# Patient Record
Sex: MALE | Race: WHITE | URBAN - METROPOLITAN AREA
[De-identification: names, ages, dates, MRNs, and addresses within clinical notes are randomized per-mention and may not be internally consistent; named-entity substitution may affect disease eponyms.]

---

## 2019-07-22 ENCOUNTER — HOSPITAL ENCOUNTER (OUTPATIENT)
Dept: MEDSURG UNIT | Facility: HOSPITAL | Age: 54
End: 2019-07-23
Attending: INTERNAL MEDICINE | Admitting: INTERNAL MEDICINE

## 2019-07-22 LAB
ABS NEUT (OLG): 7.6 X10(3)/MCL (ref 1.5–6.9)
ALBUMIN SERPL-MCNC: 4.5 GM/DL (ref 3.4–5)
ALBUMIN/GLOB SERPL: 1.5 RATIO
ALP SERPL-CCNC: 106 UNIT/L (ref 30–113)
ALT SERPL-CCNC: 34 UNIT/L (ref 10–45)
APTT PPP: 24.3 SECOND(S) (ref 25–35)
AST SERPL-CCNC: 23 UNIT/L (ref 15–37)
BASOPHILS # BLD AUTO: 0 X10(3)/MCL (ref 0–0.1)
BASOPHILS NFR BLD AUTO: 0 % (ref 0–1)
BILIRUB SERPL-MCNC: 0.4 MG/DL (ref 0.1–0.9)
BILIRUBIN DIRECT+TOT PNL SERPL-MCNC: 0.1 MG/DL (ref 0–0.3)
BILIRUBIN DIRECT+TOT PNL SERPL-MCNC: 0.3 MG/DL
BUN SERPL-MCNC: 20 MG/DL (ref 10–20)
CALCIUM SERPL-MCNC: 9.2 MG/DL (ref 8–10.5)
CHLORIDE SERPL-SCNC: 102 MMOL/L (ref 100–108)
CK MB SERPL-MCNC: 1.2 NG/ML (ref 0–3.6)
CK MB SERPL-MCNC: 1.4 NG/ML (ref 0–3.6)
CK SERPL-CCNC: 133 UNIT/L (ref 39–225)
CK SERPL-CCNC: 99 UNIT/L (ref 39–225)
CO2 SERPL-SCNC: 27 MMOL/L (ref 21–35)
CREAT SERPL-MCNC: 1.02 MG/DL (ref 0.7–1.3)
D DIMER PPP IA.FEU-MCNC: 0.17 MG/L
EOSINOPHIL # BLD AUTO: 0.1 X10(3)/MCL (ref 0–0.6)
EOSINOPHIL NFR BLD AUTO: 1 % (ref 0–5)
ERYTHROCYTE [DISTWIDTH] IN BLOOD BY AUTOMATED COUNT: 12.4 % (ref 11.5–17)
GLOBULIN SER-MCNC: 3 GM/DL
GLUCOSE SERPL-MCNC: 108 MG/DL (ref 75–116)
HCT VFR BLD AUTO: 45.7 % (ref 42–52)
HGB BLD-MCNC: 16.2 GM/DL (ref 14–18)
IMM GRANULOCYTES # BLD AUTO: 0.04 10*3/UL (ref 0–0.02)
IMM GRANULOCYTES NFR BLD AUTO: 0.4 % (ref 0–0.43)
INR PPP: 1 (ref 0–1.2)
LYMPHOCYTES # BLD AUTO: 1.5 X10(3)/MCL (ref 0.5–4.1)
LYMPHOCYTES NFR BLD AUTO: 16 % (ref 15–40)
MCH RBC QN AUTO: 31 PG (ref 27–34)
MCHC RBC AUTO-ENTMCNC: 35 GM/DL (ref 31–36)
MCV RBC AUTO: 89 FL (ref 80–99)
MONOCYTES # BLD AUTO: 0.6 X10(3)/MCL (ref 0–1.1)
MONOCYTES NFR BLD AUTO: 6 % (ref 4–12)
NEUTROPHILS # BLD AUTO: 7.6 X10(3)/MCL (ref 1.5–6.9)
NEUTROPHILS NFR BLD AUTO: 77 % (ref 43–75)
PLATELET # BLD AUTO: 203 X10(3)/MCL (ref 140–400)
PMV BLD AUTO: 8.7 FL (ref 6.8–10)
POTASSIUM SERPL-SCNC: 4.1 MMOL/L (ref 3.6–5.2)
PROT SERPL-MCNC: 7.5 GM/DL (ref 6.4–8.2)
PROTHROMBIN TIME: 10 SECOND(S) (ref 9–12)
RBC # BLD AUTO: 5.16 X10(6)/MCL (ref 4.7–6.1)
SODIUM SERPL-SCNC: 139 MMOL/L (ref 135–145)
TROPONIN I SERPL-MCNC: <0.017 NG/ML (ref 0–0.06)
TROPONIN I SERPL-MCNC: <0.017 NG/ML (ref 0–0.06)
WBC # SPEC AUTO: 9.8 X10(3)/MCL (ref 4.5–11.5)

## 2019-07-23 LAB
ABS NEUT (OLG): 4.4 X10(3)/MCL (ref 1.5–6.9)
ALBUMIN SERPL-MCNC: 3.7 GM/DL (ref 3.4–5)
ALBUMIN/GLOB SERPL: 1.3 RATIO
ALP SERPL-CCNC: 94 UNIT/L (ref 30–113)
ALT SERPL-CCNC: 30 UNIT/L (ref 10–45)
AST SERPL-CCNC: 17 UNIT/L (ref 15–37)
BASOPHILS # BLD AUTO: 0 X10(3)/MCL (ref 0–0.1)
BASOPHILS NFR BLD AUTO: 0 % (ref 0–1)
BILIRUB SERPL-MCNC: 0.5 MG/DL (ref 0.1–0.9)
BILIRUBIN DIRECT+TOT PNL SERPL-MCNC: 0.1 MG/DL (ref 0–0.3)
BILIRUBIN DIRECT+TOT PNL SERPL-MCNC: 0.4 MG/DL
BUN SERPL-MCNC: 20 MG/DL (ref 10–20)
CALCIUM SERPL-MCNC: 8.7 MG/DL (ref 8–10.5)
CHLORIDE SERPL-SCNC: 104 MMOL/L (ref 100–108)
CK MB SERPL-MCNC: 1 NG/ML (ref 0–3.6)
CK SERPL-CCNC: 81 UNIT/L (ref 39–225)
CO2 SERPL-SCNC: 29 MMOL/L (ref 21–35)
CREAT SERPL-MCNC: 1.02 MG/DL (ref 0.7–1.3)
EOSINOPHIL # BLD AUTO: 0.1 X10(3)/MCL (ref 0–0.6)
EOSINOPHIL NFR BLD AUTO: 2 % (ref 0–5)
ERYTHROCYTE [DISTWIDTH] IN BLOOD BY AUTOMATED COUNT: 12.6 % (ref 11.5–17)
GLOBULIN SER-MCNC: 2.9 GM/DL
GLUCOSE SERPL-MCNC: 103 MG/DL (ref 75–116)
HCT VFR BLD AUTO: 43 % (ref 42–52)
HGB BLD-MCNC: 15 GM/DL (ref 14–18)
IMM GRANULOCYTES # BLD AUTO: 0.03 10*3/UL (ref 0–0.02)
IMM GRANULOCYTES NFR BLD AUTO: 0.4 % (ref 0–0.43)
LYMPHOCYTES # BLD AUTO: 1.5 X10(3)/MCL (ref 0.5–4.1)
LYMPHOCYTES NFR BLD AUTO: 23 % (ref 15–40)
MCH RBC QN AUTO: 31 PG (ref 27–34)
MCHC RBC AUTO-ENTMCNC: 35 GM/DL (ref 31–36)
MCV RBC AUTO: 90 FL (ref 80–99)
MONOCYTES # BLD AUTO: 0.6 X10(3)/MCL (ref 0–1.1)
MONOCYTES NFR BLD AUTO: 8 % (ref 4–12)
NEUTROPHILS # BLD AUTO: 4.4 X10(3)/MCL (ref 1.5–6.9)
NEUTROPHILS NFR BLD AUTO: 66 % (ref 43–75)
PLATELET # BLD AUTO: 176 X10(3)/MCL (ref 140–400)
PMV BLD AUTO: 8.6 FL (ref 6.8–10)
POTASSIUM SERPL-SCNC: 4.4 MMOL/L (ref 3.6–5.2)
PROT SERPL-MCNC: 6.6 GM/DL (ref 6.4–8.2)
RBC # BLD AUTO: 4.79 X10(6)/MCL (ref 4.7–6.1)
SODIUM SERPL-SCNC: 140 MMOL/L (ref 135–145)
TROPONIN I SERPL-MCNC: <0.017 NG/ML (ref 0–0.06)
WBC # SPEC AUTO: 6.7 X10(3)/MCL (ref 4.5–11.5)

## 2019-08-15 ENCOUNTER — HISTORICAL (OUTPATIENT)
Dept: ANESTHESIOLOGY | Facility: HOSPITAL | Age: 54
End: 2019-08-15

## 2022-04-30 NOTE — OP NOTE
PROCEDURE PERFORMED:  Postoperative esophagogastroduodenoscopy.    PREOPERATIVE DIAGNOSIS:  Abdominal pain and gastritis.    POSTOPERATIVE DIAGNOSIS:  Moderate gastritis, chronic.    HISTORY OF PRESENT ILLNESS:  This is a 54-year-old white male who was having some chest pain and was initially worked up by Dr. Joiner and ruled out for cardiac ischemia because of the severity of the pain and the character of it.  He has been taking NSAIDs nightly for the last several years.  He does this typically for pain and if he has a couple alcoholic beverages.       Patient was having chest pain that was actually radiating up into his left arm through his back, and the story that he had was a little concerning.  His cardiac workup was negative.  We are pursuing EGD to confirm that there are no competing issues for his abdominal pain.  Of note, after he was on PPI and NSAID cessation, his abdominal pain improved.     He was consented for the procedure prior to.  The risks and benefits of the procedure were explained to him in detail.  He is willing to undergo those risks.    DESCRIPTION OF PROCEDURE:  The patient brought down to the endoscopy suite, laid in a semi-left lateral decubitus position.  A bite block was placed.  Viscous lidocaine applied to the posterior oropharynx.     The CRNA, Mike Melendez, was present for anesthesia.  See his documentation for medication administration consents.     EGD:  Gastroscope was then inserted down the posterior oropharynx, whereupon there were no abnormalities.  I then eased down into the esophagus and into the stomach without any abnormalities.     120 mL of Mylicon were then applied into the stomach and then aspirated out after I was able to clear up the gastric juices.     The scope was then placed into the duodenum through the pylorus.  He had some mild duodenitis.  The jejunum was free of any masses, lesions, polyps.     The scope was then pulled back in the gastric antrum.   Greater and lesser curvatures showed moderate gastritis that was chronic.  He had some cobblestoning in the angularis and fundic and cardiac regions.  In retroflexion, there was no hiatal hernia.  No masses.  No appreciable areas to explain any persistent pain.  The scope was taken out of retroflexion.  The GE junction was examined.  It was within normal limits.  No tertiary contraction of the esophagus no signs of stricture.  The scope was removed.  Patient tolerated the procedure well.     In summary, this is a 54-year-old white male with moderate gastritis and duodenitis.    RECOMMENDATIONS:  NSAID cessation and PPI for minimum of 3 months, and then will gradually allow him to participate back on a more moderate diet in terms of his dietary intake, as well as alcohol with libations.     I appreciate Dr. Joiner's assistance and initial workup.  I will continue to follow the patient up in my office.        AIDEN   DD: 08/15/2019 1039   DT: 08/15/2019 1102  Job # [id number]    cc: ALFA Mcneil III, M.D.

## 2022-04-30 NOTE — ED PROVIDER NOTES
Patient:   Han Brumfield             MRN: [id number]            FIN: [id number]               Age:   54 years     Sex:  Male     :  1965   Associated Diagnoses:   Chest pain   Author:   Gurmeet Reeves MD      Basic Information   History source: Patient.   Arrival mode: Private vehicle.   History limitation: None.   Additional information: Chief Complaint from Nursing Triage Note : Chief Complaint   2019 16:37 CDT      Chief Complaint           c/o mid chest pain starting 45 min PTA  .   Provider/Visit info:   Time Seen:  Gurmeet Reeves MD / 2019 16:43  .   History of Present Illness   The patient presents with chest pain.  The onset was just prior to arrival.  The course/duration of symptoms is improving.  Location: Substernal epigastric. Radiating pain: none. The character of symptoms is achy and sharp.  The degree at onset was moderate.  The degree at maximum was moderate.  The degree at present is minimal.  The exacerbating factor is none.  The relieving factor is none.  Risk factors consist of none and GERD.  Prior episodes: none.  Therapy today None.  Associated symptoms: none.  According to patient he was with his father at the doctor's office where he started having tightness in the chest and some sharp pains, when he left the office the pain got worse to the point that he was going to pass out.  He decided to come to the emergency room..        Review of Systems   Constitutional symptoms:  No fever, no chills, no sweats.    Skin symptoms:  No jaundice, no rash.    Eye symptoms:  Negative except as documented in HPI.   ENMT symptoms:  No sore throat,    Respiratory symptoms:  No shortness of breath, no cough, no wheezing.    Cardiovascular symptoms:  Chest pain, achy, sharp, no palpitations, no tachycardia.    Gastrointestinal symptoms:  Abdominal pain, epigastric, sharp, achy, no nausea, no vomiting, no diarrhea, no constipation.    Genitourinary symptoms:  No dysuria,     Musculoskeletal symptoms:  No back pain,    Neurologic symptoms:  No headache, no dizziness.    Psychiatric symptoms:  No anxiety, no depression, no substance abuse.    Allergy/immunologic symptoms:  No seasonal allergies,              Additional review of systems information: All other systems reviewed and otherwise negative.      Health Status   Allergies:    Allergic Reactions (Selected)  Severity Not Documented  Codeine- Rash..   Medications:  (Selected)   Documented Medications  Documented  NexIUM 40 mg oral delayed release capsule: 40 mg = 1 cap(s), Oral, Daily, # 30 cap(s), 0 Refill(s), per nurse's notes.      Past Medical/ Family/ Social History   Medical history:    Active  Gastric reflux (SNOMED CT [id number]), Reviewed as documented in chart.   Surgical history:    No active procedure history items have been selected or recorded., Reviewed as documented in chart.   Family history:    Rheumatoid arthritis  Father  Hypertension.  Mother  , Reviewed as documented in chart.   Social history:    Social & Psychosocial Habits    Alcohol  07/22/2019  Type: Beer    Frequency: Daily    Tobacco  07/22/2019  Use: Never (less than 100 in l    Patient Wants Consult For Cessation Counseling N/A    Abuse/Neglect  07/22/2019  SHX Any signs of abuse or neglect No  , Reviewed as documented in chart.   Problem list:    Active Problems (2)  Gastric reflux   Obesity   , per nurse's notes.      Physical Examination               Vital Signs   Vital Signs   7/22/2019 16:50 CDT      Heart Rate Monitored      83 bpm    7/22/2019 16:37 CDT      Temperature Oral          36.5 DegC                             Temperature Oral (calculated)             97.70 DegF                             Peripheral Pulse Rate     88 bpm                             Respiratory Rate          20 br/min                             SpO2                      98 %                             Oxygen Therapy            Room air                              Systolic Blood Pressure   175 mmHg  HI                             Diastolic Blood Pressure  106 mmHg  HI  .   Measurements   2019 16:37 CDT      Weight Dosing             91.8 kg                             Weight Measured           91.8 kg                             Weight Measured and Calculated in Lbs     202.38 lb                             Height/Length Dosing      165 cm                             Height/Length Measured    165 cm                             Body Mass Index Measured  33.72 kg/m2  .   Basic Oxygen Information   2019 16:37 CDT      SpO2                      98 %                             Oxygen Therapy            Room air  .   General:  Alert, no acute distress.    [place] coma scale:  Total score: Total score: 15.   Neurological:  Alert and oriented to person, place, time, and situation, normal motor observed, normal speech observed.    Skin:  Normal for ethnicity.   Head:  Normocephalic.   Neck:  Supple.   Eye:  Extraocular movements are intact, No icterus.    Ears, nose, mouth and throat:  Oral mucosa moist, no pharyngeal erythema or exudate.    Cardiovascular:  Regular rate and rhythm, No murmur, Normal peripheral perfusion, No edema.    Respiratory:  Lungs are clear to auscultation, respirations are non-labored, breath sounds are equal.    Back:  Nontender, Normal range of motion.    Musculoskeletal:  Normal ROM.   Chest wall   Gastrointestinal:  Soft, Nontender, Non distended, Normal bowel sounds.    Lymphatics   Psychiatric:  Cooperative, appropriate mood & affect.       Medical Decision Making   Documents reviewed:  Emergency department nurses' notes.   Orders  Launch Order Profile (Selected)   Inpatient Orders  Ordered  Cardiac Monitorin19 16:43:00 CDT, Constant Order, Place on telemetry.  IVF Normal Saline NS Infusion 1,000 mL: 1,000 mL, 1,000 mL, IV, 155 mL/hr, start date 19 16:43:00 CDT, 1.99, m2  Ordered (Exam Completed)  XR Chest 1 View: Stat, 19  16:43:00 CDT, Chest Pain, None, Stretcher, Rad Type, Not Scheduled, 07/22/19 16:43:00 CDT  Ordered (In-Lab)  BNP-Pro: STAT collect, 07/22/19 16:54:59 CDT, BLOOD, Collected, Stop date 07/22/19 16:43:00 CDT, Lab Collect  CK MB: STAT collect, 07/22/19 16:54:59 CDT, BLOOD, Collected, Stop date 07/22/19 16:54:00 CDT, Lab Collect  CMP: STAT collect, 07/22/19 16:54:59 CDT, BLOOD, Collected, Stop date 07/22/19 16:54:00 CDT, Lab Collect  CPK: STAT collect, 07/22/19 16:54:59 CDT, BLOOD, Collected, Stop date 07/22/19 16:54:00 CDT, Lab Collect  PT (Protime): STAT collect, 07/22/19 16:54:59 CDT, BLOOD, Collected, Stop date 07/22/19 16:43:00 CDT, Lab Collect  PTT: STAT collect, 07/22/19 16:54:59 CDT, BLOOD, Collected, Stop date 07/22/19 16:54:00 CDT, Lab Collect  Troponin-I: STAT collect, 07/22/19 16:54:59 CDT, BLOOD, Collected, Stop date 07/22/19 16:43:00 CDT, Lab Collect  Completed  Automated Diff: STAT collect, 07/22/19 16:54:00 CDT, Blood, Collected, Stop date 07/22/19 16:54:00 CDT, Lab Collect, 07/22/19 16:43:00 CDT  Blood Pressure: 07/22/19 16:43:00 CDT, Stop date 07/22/19 16:43:00 CDT, Monitor blood pressure.  CBC w/ Auto Diff: STAT collect, 07/22/19 16:54:59 CDT, BLOOD, Collected, Stop date 07/22/19 16:54:00 CDT, Lab Collect  EKG Adult 12 Lead: 07/22/19 16:43:00 CDT, Stat, Chest Pain, 07/22/19 16:43:00 CDT, Show to provider upon completion., -1, -1, 07/22/19 16:43:00 CDT  Pulse Oximetry: 07/22/19 16:43:00 CDT, Stop date 07/22/19 16:43:00 CDT, Place on pulse oximetry.  Monitor oxygen saturation.  Saline Lock Insert: 07/22/19 16:43:00 CDT, Stop date 07/22/19 16:43:00 CDT  aspirin 81 mg oral tablet, CHEWABLE: 324 mg, form: Tab-Chew, Oral, Once-NOW, first dose 07/22/19 16:43:00 CDT, stop date 07/22/19 16:43:00 CDT, STAT, 4 chew tab = 5 grain ASA.   Electrocardiogram:  Time 7/22/2019 16:44:00, rate 87, normal sinus rhythm, No ST-T changes, no ectopy, normal TN & QRS intervals.    Results review:  Lab results : Lab View    7/22/2019 16:54 CDT      Sodium Lvl                139 mmol/L                             Potassium Lvl             4.1 mmol/L                             Chloride                  102 mmol/L                             CO2                       27 mmol/L                             Calcium Lvl               9.2 mg/dL                             Glucose Lvl               108 mg/dL                             BUN                       20 mg/dL                             Creatinine                1.02 mg/dL                             eGFR-AA                   >60 mL/min/1.73 m2  NA                             eGFR-TRANG                  >60 mL/min/1.73 m2  NA                             Bili Total                0.4 mg/dL                             Bili Direct               0.10 mg/dL                             Bili Indirect             0.30 mg/dL  NA                             AST                       23 unit/L                             ALT                       34 unit/L                             Alk Phos                  106 unit/L                             Total Protein             7.5 gm/dL                             Albumin Lvl               4.5 gm/dL                             Globulin                  3.00 gm/dL  NA                             A/G Ratio                 1.5 ratio  NA                             NT pro BNP.               9 pg/mL                             Total CK                  133 unit/L                             CK MB                     1.4 ng/mL                             Troponin-I                <0.017 ng/mL                             PT                        10.0 second(s)                             INR                       1.0                             PTT                       24.3 second(s)  LOW                             WBC                       9.8 x10(3)/mcL                             RBC                       5.16 x10(6)/mcL                              Hgb                       16.2 gm/dL                             Hct                       45.7 %                             Platelet                  203 x10(3)/mcL                             MCV                       89 fL                             MCH                       31 pg                             MCHC                      35 gm/dL                             RDW                       12.4 %                             MPV                       8.7 fL                             Abs Neut                  7.6 x10(3)/mcL  HI                             Neutro Auto               77 %  HI                             Lymph Auto                16 %                             Mono Auto                 6 %                             Eos Auto                  1 %                             Abs Eos                   0.1 x10(3)/mcL                             Basophil Auto             0 %                             Abs Neutro                7.6 x10(3)/mcL  HI                             Abs Lymph                 1.5 x10(3)/mcL                             Abs Mono                  0.6 x10(3)/mcL                             Abs Baso                  0.0 x10(3)/mcL                             IG%                       0.400 %                             IG#                       0.0400  HI  ,    Labs (Last four charted values)  WBC                  9.8 (JUL 22)   Hgb                  16.2 (JUL 22)   Hct                  45.7 (JUL 22)   Plt                  203 (JUL 22) .    Radiology results:  07/22/2019 17:44; Leandro DHALIWAL, [facility]; ;     X-ray chest one view: No focal consolidation    .      Reexamination/ Reevaluation   Time: 7/22/2019 17:43:00 .   Vital signs   results included from flowsheet : Vital Signs   7/22/2019 17:47 CDT      Peripheral Pulse Rate     85 bpm                             Respiratory Rate          16 br/min                             SpO2                      98 %                              Oxygen Therapy            Room air                             Systolic Blood Pressure   135 mmHg                             Diastolic Blood Pressure  85 mmHg                             Mean Arterial Pressure, Cuff              102 mmHg     Notes: Patient's cardiac workup is negative at this time, he is 53 years old and I am going to admit him in the hospital and repeat his cardiac enzymes and follow..      Impression and Plan   Diagnosis   Chest pain (OPZ91-CP R07.9)      Calls-Consults   -  7/22/2019 17:44:00 .   Plan   Disposition: Admit time  7/22/2019 17:45:00, Place in Observation Telemetry Unit.    Counseled: Patient, Family.    Orders: Launch Orders   Admit/Transfer/Discharge:  Admit to Outpatient Observation (Order): 7/22/2019 17:45 CDT, Telemetry with Monitor Tarun OSMAN MD, Sasha Meza, Chest pain.

## 2022-05-04 NOTE — HISTORICAL [facility] CERNER
This is a historical note converted from Antoni. Formatting and pictures may have been removed.  Please reference Antoni for original formatting and attached multimedia. Chief Complaint  c/o mid chest pain starting 45 min PTA  Reason for Consultation  chest pain  History of Present Illness  54 M with PMHx of HTN (untreated), GERD, and Obesity, Never smoker who presents with CP.? Patient describes experiencing substernal pressure-like CP when at Dr. Ram office initially with radiation to L shoulder with diaphoresis.? Denies assoc. symptoms of SOB, lightheadedness or dizziness.  ?  No prior hx of cardiac conditions, never smoked, of drug/etoh abuse.  Good baseline functional status with no lifestyle limitations  ?  Since his hospitalizations he has had 3 cardiac enzymes (all negative) and two EKGS (w/o any acute ischemic changes).  ?  Vitals and Labs in EMR reviewed.?  ?  Pt at time of this note, had near complete resolution of CP  Review of Systems  Negative except as noted in HPI  Physical Exam  Vitals & Measurements  T:?36.8? ?C (Oral)? TMIN:?36.1? ?C (Oral)? TMAX:?36.9? ?C (Oral)? HR:?75(Monitored)? RR:?19? BP:?147/86? SpO2:?97%? WT:?91.8?kg?  Gen: Laying in bed, no acute distress  Neck: no JVD, no carotid bruit  Resp: Lungs CTA b/l, no w/r/r  Heart: S1,S2, RRR, no m/r/g  GI: abd soft, Non-tender to palp  Ext: warm, 2+ b/l pulses, no edema, no skin changes  Assessment/Plan  Chest pain?[id number]  Chest pain?R07.9  Typical and atypical features  Cardiac Enz neg x 3  EKG w/o acute ischemic changes  Vitals stable  CP nearly resolved  ?  Plan:  Ok for discharge from cardiac standpoint  Pt needs an output ischemic w/u and cardiac risk stratification: TTE and Stress test  Pt can f/u with me at my office for above w/u  d/c home w/ 81mg ASA QD and 0.4 mg SL NTG PRN for CP  Pt advised to avoid any exertional activity including sex until cardiac w/u is complete.  ?  ?  ?   Problem List/Past  Medical History  Ongoing  Gastric reflux  Obesity  Historical  No qualifying data  Medications  Inpatient  Lovenox, 40 mg= 0.4 mL, Subcutaneous, Daily  nitroglycerin 0.4 mg sublingual TAB, 0.4 mg= 1 tab(s), SL, q5min, PRN  Protonix IV 40 mg intravenous injection +  100 mL  Zofran, 4 mg= 2 mL, IV Push, q4hr, PRN  Home  fluticasone 50 mcg/inh nasal spray, 1 spray(s), Nasal, Daily  NexIUM 40 mg oral delayed release capsule, 40 mg= 1 cap(s), Oral, Daily  Allergies  codeine?(Rash)  Social History  Abuse/Neglect  No, 07/22/2019  Alcohol  Beer, Daily, 07/22/2019  Tobacco  Never (less than 100 in lifetime), N/A, 07/22/2019  Family History  Hypertension.: Mother.  Rheumatoid arthritis: Father.

## 2022-05-04 NOTE — HISTORICAL [facility] CERNER
This is a historical note converted from Antoni. Formatting and pictures may have been removed.  Please reference Antoni for original formatting and attached multimedia. Chief Complaint  c/o mid chest pain starting 45 min PTA  History of Present Illness  This is a 54-year-old white male patient who?began having chest pain this afternoon.? He was actually in my office attending to his parents appointment with me at which point time while he left?started having some chest pain (he made no mention of that when i saw him) . ?He said it was central?chest pain that radiated across his?lower?precordial area and that radiated to his back and ?left arm?as a dull ache. ?He said by the time he Back home to [place], where he resides, he had to come back into the emergency room in [place]?at [facility]after?calling my office and?receiving?instructions.  ?  Patient was evaluated by Dr. Reeves. ?Initial cardiac workup was negative and were admitting him for 24-hour observation.  ?  Upon further questioning,?the patient is been using NSAIDs approximate 3-4 per day, usually at bedtime?for a number of years.? He usually takes these to avoid headaches?from drinking 2-3 beers?at night.? Of note,?he had some high blood pressure?approximately a year ago but lost about 15 pounds?and we elected to get him off the blood pressure medications. ?He presented to the emergency room today?in?hypertensive?state.  Review of Systems  See above, denies shortness of breath?denies leg swelling?denies postprandial nausea vomiting, denies dark stool  Physical Exam  Vitals & Measurements  T:?36.9? ?C (Oral)? TMIN:?36.5? ?C (Oral)? TMAX:?36.9? ?C (Oral)? HR:?87(Peripheral)? RR:?18? BP:?145/83? SpO2:?96%? WT:?91.8?kg?  He is alert and oriented ?3, his wife and 2 sons are present.? He is in no apparent distress.? Cranial nerve II-12 appear to be intact. ?No JVD.? Cardiac exam is regular rate and rhythm no rubs Murmurs?clear to auscultation  bilaterally. ?He has no chest wall tenderness. ?Full range of motion the upper shoulder elbow on the left side.  Assessment/Plan  Chest pain?[id number]  Differential diagnoses at this point, from most concerning standpoint will rule out cardiac enzymes??3. ?The 1st 2 are negative. ?Shania also get a d-dimer.? This is pending.? From a clinical standpoint I think which is more plausible is that he likely has some NSAID induced?GI issue. ?Close monitoring for?peptic ulcer disease. ?Consider EGD if the symptoms occur. ?Ill place him on a PPI?twice a day IV for now and then discharged him on PPIs. ?His?H&Hs are normal. ?However,?if he continues to show any?continued symptoms will perform urgent EGD as an outpatient?versus inpatient.  ?  Alternatively, Im concerned about his symptomatology at the time of presentation. ?He had?essentially, until proven otherwise, cardiac pain referring up to his left arm.? More plausibly the GI pain?could be sent radiating to his back which he was having.? The most plausible scenario is chronic?NSAID use?with?elevated blood pressure with cardiac strain. ?On that basis, Ill consult cardiology for outpatient workup, assuming all of his?cardiac?enzymes are negative.  ?  Therapeutically?PPI intravenously for GI, and nitroglycerin when necessary for any chest pain.  ?  Chest pain?R07.9   Problem List/Past Medical History  Ongoing  Gastric reflux  Obesity  Historical  No qualifying data  Medications  Inpatient  IVF Normal Saline NS Infusion 1,000 mL, 1000 mL, IV  Lovenox, 40 mg= 0.4 mL, Subcutaneous, Daily  Zofran, 4 mg= 2 mL, IV Push, q4hr, PRN  Home  fluticasone 50 mcg/inh nasal spray, 1 spray(s), Nasal, Daily  NexIUM 40 mg oral delayed release capsule, 40 mg= 1 cap(s), Oral, Daily  Allergies  codeine?(Rash)  Social History  Abuse/Neglect  No, 07/22/2019  Alcohol  Beer, Daily, 07/22/2019  Tobacco  Never (less than 100 in lifetime), N/A, 07/22/2019  Family  History  Hypertension.: Mother.  Rheumatoid arthritis: Father.  Lab Results  Test Name Test Result Date/Time Comments   Sodium Lvl 139 mmol/L 07/22/2019 16:54 CDT    Potassium Lvl 4.1 mmol/L 07/22/2019 16:54 CDT    Chloride 102 mmol/L 07/22/2019 16:54 CDT    CO2 27 mmol/L 07/22/2019 16:54 CDT    Calcium Lvl 9.2 mg/dL 07/22/2019 16:54 CDT    Glucose Lvl 108 mg/dL 07/22/2019 16:54 CDT    BUN 20 mg/dL 07/22/2019 16:54 CDT    Creatinine 1.02 mg/dL 07/22/2019 16:54 CDT    eGFR-AA >60 mL/min/1.73 m2 07/22/2019 16:54 CDT    eGFR-TRANG >60 mL/min/1.73 m2 07/22/2019 16:54 CDT    Bili Total 0.4 mg/dL 07/22/2019 16:54 CDT    Bili Direct 0.10 mg/dL 07/22/2019 16:54 CDT    Bili Indirect 0.30 mg/dL 07/22/2019 16:54 CDT    AST 23 unit/L 07/22/2019 16:54 CDT    ALT 34 unit/L 07/22/2019 16:54 CDT    Alk Phos 106 unit/L 07/22/2019 16:54 CDT    Total Protein 7.5 gm/dL 07/22/2019 16:54 CDT    Albumin Lvl 4.5 gm/dL 07/22/2019 16:54 CDT    Globulin 3.00 gm/dL 07/22/2019 16:54 CDT    A/G Ratio 1.5 ratio 07/22/2019 16:54 CDT    NT pro BNP. 9 pg/mL 07/22/2019 16:54 CDT    Troponin-I <0.017 ng/mL 07/22/2019 16:54 CDT 0.5 ng/mL is the accepted discriminant level for mycardial injury rather than a statistical normal limit for the general population.   PT 10.0 second(s) 07/22/2019 16:54 CDT    INR 1.0 07/22/2019 16:54 CDT    PTT 24.3 second(s) (Low) 07/22/2019 16:54 CDT    WBC 9.8 x10(3)/mcL 07/22/2019 16:54 CDT    Hgb 16.2 gm/dL 07/22/2019 16:54 CDT    Hct 45.7 % 07/22/2019 16:54 CDT    Platelet 203 x10(3)/mcL 07/22/2019 16:54 CDT    Diagnostic Results  (07/22/2019 16:51 CDT XR Chest 1 View)  ?? ? ? ??  * Final Report *  ?  Reason For Exam  Chest Pain  ?  Radiology Report  EXAMINATION: AP view the chest  ?  EXAMINATION DATE: 7/22/2019  ?  COMPARISON: None  ?  TECHNIQUE: As above  ?  CLINICAL HISTORY: Chest pain  ?  FINDINGS:  ?  The cardiomediastinal silhouette and pulmonary vasculature are normal.  ?  The lungs and pleural spaces are  clear.  ?  IMPRESSION:?  ?  No acute cardiopulmonary process.  ?  ?  Signature Line  Electronically Signed By: Pedro Leija DO  Date/Time Signed: 07/22/2019 17:57 [1]  ?  EKG shows normal sinus rhythm no ST segment changes or elevation no LVH     [1]?XR Chest 1 View; Pedro Leija DO 07/22/2019 16:51 CDT